# Patient Record
Sex: FEMALE | Race: OTHER | Employment: FULL TIME | URBAN - METROPOLITAN AREA
[De-identification: names, ages, dates, MRNs, and addresses within clinical notes are randomized per-mention and may not be internally consistent; named-entity substitution may affect disease eponyms.]

---

## 2024-03-20 ENCOUNTER — OFFICE VISIT (OUTPATIENT)
Dept: FAMILY MEDICINE CLINIC | Facility: CLINIC | Age: 44
End: 2024-03-20

## 2024-03-20 VITALS
WEIGHT: 175.2 LBS | HEART RATE: 66 BPM | BODY MASS INDEX: 29.19 KG/M2 | HEIGHT: 65 IN | OXYGEN SATURATION: 98 % | SYSTOLIC BLOOD PRESSURE: 116 MMHG | DIASTOLIC BLOOD PRESSURE: 68 MMHG | RESPIRATION RATE: 16 BRPM | TEMPERATURE: 97.4 F

## 2024-03-20 DIAGNOSIS — Z12.31 ENCOUNTER FOR SCREENING MAMMOGRAM FOR BREAST CANCER: ICD-10-CM

## 2024-03-20 DIAGNOSIS — E78.5 HYPERLIPIDEMIA, UNSPECIFIED HYPERLIPIDEMIA TYPE: ICD-10-CM

## 2024-03-20 DIAGNOSIS — Z00.00 HEALTHCARE MAINTENANCE: Primary | ICD-10-CM

## 2024-03-20 DIAGNOSIS — Z12.4 CERVICAL CANCER SCREENING: ICD-10-CM

## 2024-03-20 PROCEDURE — 99386 PREV VISIT NEW AGE 40-64: CPT | Performed by: NURSE PRACTITIONER

## 2024-03-20 NOTE — ASSESSMENT & PLAN NOTE
- Reviewed immunizations and screenings.   - Discussed regular dental and vision exams.   - Referred to GYN.   - Mammogram and routine labs ordered.

## 2024-03-20 NOTE — PROGRESS NOTES
Name: Nathalia Cohen      : 1980      MRN: [id number]  Encounter Provider: KELLY Bhandari  Encounter Date: 3/20/2024   Encounter department: [facility]    Assessment & Plan     1. Healthcare maintenance  Assessment & Plan:  - Reviewed immunizations and screenings.   - Discussed regular dental and vision exams.   - Referred to GYN.   - Mammogram and routine labs ordered.     Orders:  -     CBC and differential; Future  -     Comprehensive metabolic panel; Future  -     Lipid Panel with Direct LDL reflex; Future  -     TSH, 3rd generation with Free T4 reflex; Future    2. Hyperlipidemia, unspecified hyperlipidemia type  Assessment & Plan:  - Encourage healthy diet and regular exercise.  - Will obtain updated fasting lipid panel.     Orders:  -     Lipid Panel with Direct LDL reflex; Future    3. Encounter for screening mammogram for breast cancer  -     Mammo screening bilateral w 3d & cad; Future; Expected date: 2024    4. Cervical cancer screening  -     Ambulatory Referral to Obstetrics / Gynecology; Future           Subjective     Patient with self-reported history of HLD presents to office today to establish care. She is taking no medications on a daily basis. Just moved her from [place] about 6 months ago. Has no significant concerns or complaints today.             Review of Systems   Constitutional:  Negative for fatigue and fever.   HENT:  Negative for congestion, postnasal drip and trouble swallowing.    Eyes:  Negative for visual disturbance.   Respiratory:  Negative for cough and shortness of breath.    Cardiovascular:  Negative for chest pain and palpitations.   Gastrointestinal:  Negative for abdominal pain and blood in stool.   Endocrine: Negative for cold intolerance and heat intolerance.   Genitourinary:  Negative for difficulty urinating, dysuria and frequency.   Musculoskeletal:  Negative for gait problem.   Skin:  Negative for rash.  "  Neurological:  Negative for dizziness, syncope and headaches.   Hematological:  Negative for adenopathy.   Psychiatric/Behavioral:  Negative for behavioral problems.        Past Medical History:   Diagnosis Date   • High cholesterol      History reviewed. No pertinent surgical history.  Family History   Problem Relation Age of Onset   • Hypertension Father    • Diabetes Father    • Diabetes Paternal Grandmother      Social History     Socioeconomic History   • Marital status: Single     Spouse name: None   • Number of children: None   • Years of education: None   • Highest education level: None   Occupational History   • None   Tobacco Use   • Smoking status: Never   • Smokeless tobacco: Never   Vaping Use   • Vaping status: Never Used   Substance and Sexual Activity   • Alcohol use: Not Currently   • Drug use: Never   • Sexual activity: None   Other Topics Concern   • None   Social History Narrative   • None     Social Determinants of Health     Financial Resource Strain: Not on file   Food Insecurity: Not on file   Transportation Needs: Not on file   Physical Activity: Not on file   Stress: Not on file   Social Connections: Not on file   Intimate Partner Violence: Not on file   Housing Stability: Not on file     No current outpatient medications on file prior to visit.     No Known Allergies    There is no immunization history on file for this patient.    Objective     /68 (BP Location: Left arm, Patient Position: Sitting, Cuff Size: Large)   Pulse 66   Temp (!) 97.4 °F (36.3 °C) (Tympanic)   Resp 16   Ht 5' 5\" (1.651 m)   Wt 79.5 kg (175 lb 3.2 oz)   SpO2 98%   BMI 29.15 kg/m²     Physical Exam  Vitals and nursing note reviewed.   Constitutional:       General: She is not in acute distress.     Appearance: Normal appearance. She is not ill-appearing.   HENT:      Head: Normocephalic and atraumatic.      Right Ear: Tympanic membrane, ear canal and external ear normal.      Left Ear: Tympanic " membrane, ear canal and external ear normal.      Nose: Nose normal.      Mouth/Throat:      Mouth: Mucous membranes are moist.   Eyes:      Conjunctiva/sclera: Conjunctivae normal.      Pupils: Pupils are equal, round, and reactive to light.   Cardiovascular:      Rate and Rhythm: Normal rate and regular rhythm.      Heart sounds: Normal heart sounds.   Pulmonary:      Effort: Pulmonary effort is normal.      Breath sounds: Normal breath sounds.   Abdominal:      General: Bowel sounds are normal.      Palpations: Abdomen is soft.   Musculoskeletal:         General: Normal range of motion.      Cervical back: Normal range of motion.   Lymphadenopathy:      Cervical: No cervical adenopathy.   Skin:     General: Skin is warm and dry.   Neurological:      Mental Status: She is alert and oriented to person, place, and time.   Psychiatric:         Mood and Affect: Mood normal.         Behavior: Behavior normal.       KELLY Bhandari

## 2024-03-21 ENCOUNTER — APPOINTMENT (OUTPATIENT)
Dept: LAB | Age: 44
End: 2024-03-21
Payer: COMMERCIAL

## 2024-03-21 DIAGNOSIS — Z00.00 HEALTHCARE MAINTENANCE: ICD-10-CM

## 2024-03-21 DIAGNOSIS — E78.5 HYPERLIPIDEMIA, UNSPECIFIED HYPERLIPIDEMIA TYPE: ICD-10-CM

## 2024-03-21 LAB
ALBUMIN SERPL BCP-MCNC: 4.2 G/DL (ref 3.5–5)
ALP SERPL-CCNC: 41 U/L (ref 34–104)
ALT SERPL W P-5'-P-CCNC: 13 U/L (ref 7–52)
ANION GAP SERPL CALCULATED.3IONS-SCNC: 8 MMOL/L (ref 4–13)
AST SERPL W P-5'-P-CCNC: 15 U/L (ref 13–39)
BASOPHILS # BLD AUTO: 0.04 THOUSANDS/ÂΜL (ref 0–0.1)
BASOPHILS NFR BLD AUTO: 1 % (ref 0–1)
BILIRUB SERPL-MCNC: 1.03 MG/DL (ref 0.2–1)
BUN SERPL-MCNC: 7 MG/DL (ref 5–25)
CALCIUM SERPL-MCNC: 9 MG/DL (ref 8.4–10.2)
CHLORIDE SERPL-SCNC: 104 MMOL/L (ref 96–108)
CHOLEST SERPL-MCNC: 189 MG/DL
CO2 SERPL-SCNC: 26 MMOL/L (ref 21–32)
CREAT SERPL-MCNC: 0.58 MG/DL (ref 0.6–1.3)
EOSINOPHIL # BLD AUTO: 0.45 THOUSAND/ÂΜL (ref 0–0.61)
EOSINOPHIL NFR BLD AUTO: 8 % (ref 0–6)
ERYTHROCYTE [DISTWIDTH] IN BLOOD BY AUTOMATED COUNT: 13.3 % (ref 11.6–15.1)
GFR SERPL CREATININE-BSD FRML MDRD: 113 ML/MIN/1.73SQ M
GLUCOSE P FAST SERPL-MCNC: 93 MG/DL (ref 65–99)
HCT VFR BLD AUTO: 39.5 % (ref 34.8–46.1)
HDLC SERPL-MCNC: 71 MG/DL
HGB BLD-MCNC: 13 G/DL (ref 11.5–15.4)
IMM GRANULOCYTES # BLD AUTO: 0.01 THOUSAND/UL (ref 0–0.2)
IMM GRANULOCYTES NFR BLD AUTO: 0 % (ref 0–2)
LDLC SERPL CALC-MCNC: 109 MG/DL (ref 0–100)
LYMPHOCYTES # BLD AUTO: 2.29 THOUSANDS/ÂΜL (ref 0.6–4.47)
LYMPHOCYTES NFR BLD AUTO: 42 % (ref 14–44)
MCH RBC QN AUTO: 32.3 PG (ref 26.8–34.3)
MCHC RBC AUTO-ENTMCNC: 32.9 G/DL (ref 31.4–37.4)
MCV RBC AUTO: 98 FL (ref 82–98)
MONOCYTES # BLD AUTO: 0.49 THOUSAND/ÂΜL (ref 0.17–1.22)
MONOCYTES NFR BLD AUTO: 9 % (ref 4–12)
NEUTROPHILS # BLD AUTO: 2.22 THOUSANDS/ÂΜL (ref 1.85–7.62)
NEUTS SEG NFR BLD AUTO: 40 % (ref 43–75)
NRBC BLD AUTO-RTO: 0 /100 WBCS
PLATELET # BLD AUTO: 201 THOUSANDS/UL (ref 149–390)
PMV BLD AUTO: 11.2 FL (ref 8.9–12.7)
POTASSIUM SERPL-SCNC: 3.5 MMOL/L (ref 3.5–5.3)
PROT SERPL-MCNC: 6.6 G/DL (ref 6.4–8.4)
RBC # BLD AUTO: 4.02 MILLION/UL (ref 3.81–5.12)
SODIUM SERPL-SCNC: 138 MMOL/L (ref 135–147)
TRIGL SERPL-MCNC: 43 MG/DL
TSH SERPL DL<=0.05 MIU/L-ACNC: 1.25 UIU/ML (ref 0.45–4.5)
WBC # BLD AUTO: 5.5 THOUSAND/UL (ref 4.31–10.16)

## 2024-03-21 PROCEDURE — 84443 ASSAY THYROID STIM HORMONE: CPT

## 2024-03-21 PROCEDURE — 36415 COLL VENOUS BLD VENIPUNCTURE: CPT

## 2024-03-21 PROCEDURE — 80053 COMPREHEN METABOLIC PANEL: CPT

## 2024-03-21 PROCEDURE — 85025 COMPLETE CBC W/AUTO DIFF WBC: CPT

## 2024-03-21 PROCEDURE — 80061 LIPID PANEL: CPT

## 2024-04-05 ENCOUNTER — CONSULT (OUTPATIENT)
Dept: OBGYN CLINIC | Facility: CLINIC | Age: 44
End: 2024-04-05
Payer: COMMERCIAL

## 2024-04-05 VITALS
SYSTOLIC BLOOD PRESSURE: 110 MMHG | HEIGHT: 65 IN | WEIGHT: 176 LBS | BODY MASS INDEX: 29.32 KG/M2 | DIASTOLIC BLOOD PRESSURE: 64 MMHG

## 2024-04-05 DIAGNOSIS — Z01.419 WELL WOMAN EXAM WITH ROUTINE GYNECOLOGICAL EXAM: Primary | ICD-10-CM

## 2024-04-05 DIAGNOSIS — Z12.4 CERVICAL CANCER SCREENING: ICD-10-CM

## 2024-04-05 DIAGNOSIS — Z30.011 ENCOUNTER FOR INITIAL PRESCRIPTION OF CONTRACEPTIVE PILLS: ICD-10-CM

## 2024-04-05 PROCEDURE — G0145 SCR C/V CYTO,THINLAYER,RESCR: HCPCS | Performed by: OBSTETRICS & GYNECOLOGY

## 2024-04-05 PROCEDURE — 99386 PREV VISIT NEW AGE 40-64: CPT | Performed by: OBSTETRICS & GYNECOLOGY

## 2024-04-05 PROCEDURE — G0476 HPV COMBO ASSAY CA SCREEN: HCPCS | Performed by: OBSTETRICS & GYNECOLOGY

## 2024-04-05 RX ORDER — NORETHINDRONE ACETATE AND ETHINYL ESTRADIOL .02; 1 MG/1; MG/1
1 TABLET ORAL DAILY
Qty: 84 TABLET | Refills: 3 | Status: SHIPPED | OUTPATIENT
Start: 2024-04-05 | End: 2024-06-28

## 2024-04-05 NOTE — PROGRESS NOTES
"Jo Cohen is a 43 y.o. female who presents for annual well woman exam.     GYN:   No vaginal discharge, labial erythema or lesions, dyspareunia.   Menarche at 10.   LMP: 3/28/24   Menses are regular, q 28-30 days, lasting 2-3 days.   Contraception: none, has been on OCP's in the past   Patient is  sexually active with male partner.   Gynecologic surgeries: none    OB:    female   's    :   No dysuria, urinary frequency or urgency.   No hematuria, flank pain, incontinence.    Breast:   No breast mass, skin changes, dimpling, reddening, nipple retraction.   No breast discharge.   Patient does not have a family history of breast, endometrial, or ovarian ca.     General:   She does  feel safe in the relationship.  She does feel safe in her home.   Exercises  0  times per week.   ETOH use: occasional   Tobacco use: none   Recreational drug use: none    Screening:   Cervical cancer: last pap smear in . Results were normal per patient.   Breast cancer: no prior screening, currently scheduled   Colon cancer: N/A   STD screening: no prior history.    Review of Systems  Pertinent items are noted in HPI.      Objective      /64 (BP Location: Left arm, Patient Position: Sitting, Cuff Size: Standard)   Ht 5' 5\" (1.651 m)   Wt 79.8 kg (176 lb)   LMP 2024 (Exact Date)   BMI 29.29 kg/m²     Physical Exam  Constitutional:       Appearance: She is well-developed.   Cardiovascular:      Rate and Rhythm: Normal rate and regular rhythm.      Heart sounds: Normal heart sounds. No murmur heard.     No friction rub. No gallop.   Pulmonary:      Effort: Pulmonary effort is normal. No respiratory distress.      Breath sounds: No wheezing.   Abdominal:      Palpations: Abdomen is soft.      Tenderness: There is no abdominal tenderness.   Musculoskeletal:         General: No tenderness.   Neurological:      Mental Status: She is alert and oriented to person, place, and time.   Vitals " reviewed.            Assessment     Nathalia Cohen is a 43 y.o.  with normal gynecologic exam.     Plan     1.  Routine well woman exam done today.  2.  Pap and HPV:Pap with HPV was done today.  Current ASCCP Guidelines reviewed.  3.  Mammogram ordered. Recommend yearly mammography.   4.  The patient declined STD testing.  Safe sex practices have been discussed.  5. The patient is sexually active. She accepted contraception and options have been discussed.  OCP's were ordered.  6.  Exercise Recommendation: The importance of regular exercise/physical activity was discussed. Recommend exercise 3-5 times per week for at least 30 minutes.   7. Patient to return to office in 12 months for annual exam.

## 2024-04-09 LAB
HPV HR 12 DNA CVX QL NAA+PROBE: NEGATIVE
HPV16 DNA CVX QL NAA+PROBE: NEGATIVE
HPV18 DNA CVX QL NAA+PROBE: NEGATIVE

## 2024-04-13 LAB
LAB AP GYN PRIMARY INTERPRETATION: NORMAL
Lab: NORMAL

## 2024-05-01 PROBLEM — Z00.00 HEALTHCARE MAINTENANCE: Status: RESOLVED | Noted: 2024-03-20 | Resolved: 2024-05-01

## 2024-05-01 PROBLEM — Z12.4 CERVICAL CANCER SCREENING: Status: RESOLVED | Noted: 2024-03-20 | Resolved: 2024-05-01

## 2024-06-26 ENCOUNTER — HOSPITAL ENCOUNTER (OUTPATIENT)
Dept: MAMMOGRAPHY | Facility: CLINIC | Age: 44
Discharge: HOME | End: 2024-06-26
Payer: COMMERCIAL

## 2024-06-26 DIAGNOSIS — Z12.31 ENCOUNTER FOR SCREENING MAMMOGRAM FOR BREAST CANCER: ICD-10-CM

## 2024-06-26 PROCEDURE — 77063 BREAST TOMOSYNTHESIS BI: CPT

## 2024-06-26 PROCEDURE — 77067 SCR MAMMO BI INCL CAD: CPT

## 2025-02-17 ENCOUNTER — HOSPITAL ENCOUNTER (EMERGENCY)
Facility: HOSPITAL | Age: 45
Discharge: HOME | End: 2025-02-17
Attending: EMERGENCY MEDICINE
Payer: COMMERCIAL

## 2025-02-17 VITALS
SYSTOLIC BLOOD PRESSURE: 158 MMHG | DIASTOLIC BLOOD PRESSURE: 87 MMHG | OXYGEN SATURATION: 99 % | HEART RATE: 66 BPM | RESPIRATION RATE: 18 BRPM | TEMPERATURE: 98.8 F

## 2025-02-17 DIAGNOSIS — Z30.011 ENCOUNTER FOR INITIAL PRESCRIPTION OF CONTRACEPTIVE PILLS: ICD-10-CM

## 2025-02-17 DIAGNOSIS — R09.81 NASAL CONGESTION: ICD-10-CM

## 2025-02-17 DIAGNOSIS — J02.9 PHARYNGITIS: Primary | ICD-10-CM

## 2025-02-17 LAB
FLUAV AG UPPER RESP QL IA.RAPID: NEGATIVE
FLUBV AG UPPER RESP QL IA.RAPID: NEGATIVE
S PYO DNA THROAT QL NAA+PROBE: NOT DETECTED
SARS-COV+SARS-COV-2 AG RESP QL IA.RAPID: NEGATIVE

## 2025-02-17 PROCEDURE — 87811 SARS-COV-2 COVID19 W/OPTIC: CPT | Performed by: EMERGENCY MEDICINE

## 2025-02-17 PROCEDURE — 87651 STREP A DNA AMP PROBE: CPT | Performed by: EMERGENCY MEDICINE

## 2025-02-17 PROCEDURE — 99284 EMERGENCY DEPT VISIT MOD MDM: CPT | Performed by: EMERGENCY MEDICINE

## 2025-02-17 PROCEDURE — 87804 INFLUENZA ASSAY W/OPTIC: CPT | Performed by: EMERGENCY MEDICINE

## 2025-02-17 PROCEDURE — 99282 EMERGENCY DEPT VISIT SF MDM: CPT

## 2025-02-17 RX ORDER — ACETAMINOPHEN 325 MG/1
975 TABLET ORAL ONCE
Status: COMPLETED | OUTPATIENT
Start: 2025-02-17 | End: 2025-02-17

## 2025-02-17 RX ORDER — OXYMETAZOLINE HYDROCHLORIDE 0.05 G/100ML
2 SPRAY NASAL ONCE
Status: COMPLETED | OUTPATIENT
Start: 2025-02-17 | End: 2025-02-17

## 2025-02-17 RX ORDER — NAPROXEN 250 MG/1
250 TABLET ORAL ONCE
Status: COMPLETED | OUTPATIENT
Start: 2025-02-17 | End: 2025-02-17

## 2025-02-17 RX ADMIN — OXYMETAZOLINE HYDROCHLORIDE 2 SPRAY: 0.05 SPRAY NASAL at 13:55

## 2025-02-17 RX ADMIN — DEXAMETHASONE SODIUM PHOSPHATE 10 MG: 10 INJECTION, SOLUTION INTRAMUSCULAR; INTRAVENOUS at 13:55

## 2025-02-17 RX ADMIN — NAPROXEN 250 MG: 250 TABLET ORAL at 13:57

## 2025-02-17 RX ADMIN — ACETAMINOPHEN 975 MG: 325 TABLET, FILM COATED ORAL at 13:55

## 2025-02-17 NOTE — Clinical Note
Nathalia Cohen was seen and treated in our emergency department on 2/17/2025.                Diagnosis:     Nathalia  may return to work on return date.    She may return on this date: 02/20/2025         If you have any questions or concerns, please don't hesitate to call.      Lyle Arndt MD    ______________________________           _______________          _______________  Hospital Representative                              Date                                Time

## 2025-02-17 NOTE — ED PROVIDER NOTES
Time reflects when diagnosis was documented in both MDM as applicable and the Disposition within this note       Time User Action Codes Description Comment    2/17/2025  1:48 PM Lyle Arndt Add [J02.9] Pharyngitis     2/17/2025  1:48 PM Lyle Arndt Add [R09.81] Nasal congestion           ED Disposition       ED Disposition   Discharge    Condition   Stable    Date/Time   Mon Feb 17, 2025  1:48 PM    Comment   Nathalia Cohen discharge to home/self care.                   Assessment & Plan       Medical Decision Making  44-year-old female here with sore throat, nasal congestion, cough, general malaise.  Oropharyngeal exam seems most consistent with viral pharyngitis but will swab for strep at patient's request.  Also swab for flu/COVID at patient's request.  Treat with single dose Decadron, Afrin for nasal congestion not to be used for more than 3 to 5 days, recommend NSAIDs or Tylenol for analgesia.  Rest, fluids, follow-up with PCP.  Given note for work.  Discussed outpatient follow-up with return precautions.    Amount and/or Complexity of Data Reviewed  Labs: ordered.    Risk  OTC drugs.  Prescription drug management.             Medications   naproxen (NAPROSYN) tablet 250 mg (250 mg Oral Given 2/17/25 1357)   acetaminophen (TYLENOL) tablet 975 mg (975 mg Oral Given 2/17/25 1355)   dexamethasone oral liquid 10 mg 1 mL (10 mg Oral Given 2/17/25 1355)   oxymetazoline (AFRIN) 0.05 % nasal spray 2 spray (2 sprays Each Nare Given 2/17/25 1355)       ED Risk Strat Scores                                                History of Present Illness       Chief Complaint   Patient presents with    Sore Throat     Sore throat, headache, congestion ongoing since friday       Past Medical History:   Diagnosis Date    High cholesterol       History reviewed. No pertinent surgical history.   Family History   Problem Relation Age of Onset    Hypertension Father     Diabetes Father         My grand mother dies with  diabetes    Diabetes Paternal Grandmother     Breast cancer Neg Hx       Social History     Tobacco Use    Smoking status: Never    Smokeless tobacco: Never   Vaping Use    Vaping status: Never Used   Substance Use Topics    Alcohol use: Yes     Alcohol/week: 4.0 standard drinks of alcohol     Types: 2 Glasses of wine, 2 Cans of beer per week     Comment: OCC.    Drug use: Never      E-Cigarette/Vaping    E-Cigarette Use Never User       E-Cigarette/Vaping Substances    Nicotine No     THC No     CBD No     Flavoring No     Other No     Unknown No       I have reviewed and agree with the history as documented.     Nathalia is a pleasant 44-year-old female here for evaluation of 1 to 2-day history of sore throat, nasal congestion, cough, mild laryngitis.  She went to work this morning but had to leave because she was having trouble communicating due to laryngitis.  Complains of frontal sinus pressure, painful swallowing but is able to tolerate liquids.  Reports mild generalized fatigue/malaise.  No fevers.  No nausea or vomiting.  Has mild to moderate gradual onset headache without severe headache, neck pain/stiffness.  Denies known medical problems takes only OCPs.  No chest pain or shortness of breath.  No abdominal pain.  No rashes.      Sore Throat  Associated symptoms: cough and voice change    Associated symptoms: no abdominal pain, no chest pain, no chills, no ear pain, no fever, no rash, no shortness of breath and no trouble swallowing        Review of Systems   Constitutional:  Positive for fatigue. Negative for chills and fever.   HENT:  Positive for sinus pressure, sinus pain, sore throat and voice change. Negative for ear pain and trouble swallowing.    Eyes:  Negative for visual disturbance.   Respiratory:  Positive for cough. Negative for shortness of breath.    Cardiovascular:  Negative for chest pain and palpitations.   Gastrointestinal:  Negative for abdominal pain, nausea and vomiting.    Genitourinary:  Negative for dysuria and hematuria.   Musculoskeletal:  Negative for arthralgias and back pain.   Skin:  Negative for color change and rash.   Neurological:  Negative for syncope.   All other systems reviewed and are negative.          Objective       ED Triage Vitals   Temperature Pulse Blood Pressure Respirations SpO2 Patient Position - Orthostatic VS   02/17/25 1307 02/17/25 1307 02/17/25 1307 02/17/25 1307 02/17/25 1307 02/17/25 1307   98.8 °F (37.1 °C) 66 158/87 18 99 % Sitting      Temp Source Heart Rate Source BP Location FiO2 (%) Pain Score    02/17/25 1307 02/17/25 1307 02/17/25 1307 -- 02/17/25 1355    Oral Monitor Right arm  5      Vitals      Date and Time Temp Pulse SpO2 Resp BP Pain Score FACES Pain Rating User   02/17/25 1355 -- -- -- -- -- 5 -- [facility]   02/17/25 1307 98.8 °F (37.1 °C) 66 99 % 18 158/87 -- -- HW            Physical Exam  Vitals and nursing note reviewed.   Constitutional:       General: She is not in acute distress.     Appearance: She is well-developed.   HENT:      Head: Normocephalic and atraumatic.      Mouth/Throat:      Mouth: Mucous membranes are moist.      Pharynx: Posterior oropharyngeal erythema present. No oropharyngeal exudate.      Comments: Pharyngeal erythema with a few small scattered lesions most consistent with stomatitis.  True exudate seems less likely.  Uvula is midline, no evidence of PTA or RPA.  No trismus.  Sublingual area is soft and unremarkable.  Eyes:      Conjunctiva/sclera: Conjunctivae normal.   Cardiovascular:      Rate and Rhythm: Normal rate and regular rhythm.      Heart sounds: No murmur heard.  Pulmonary:      Effort: Pulmonary effort is normal. No respiratory distress.      Breath sounds: Normal breath sounds.   Abdominal:      Palpations: Abdomen is soft.      Tenderness: There is no abdominal tenderness.   Musculoskeletal:         General: No swelling.      Cervical back: Neck supple.   Skin:     General: Skin is warm and dry.       Capillary Refill: Capillary refill takes less than 2 seconds.   Neurological:      Mental Status: She is alert.   Psychiatric:         Mood and Affect: Mood normal.         Results Reviewed       Procedure Component Value Units Date/Time    Strep A PCR [[id number]]  (Normal) Collected: 02/17/25 1353    Lab Status: Final result Specimen: Throat Updated: 02/17/25 1442     STREP A PCR Not Detected    FLU/COVID Rapid Antigen (30 min. TAT) - Preferred screening test in ED [[id number]]  (Normal) Collected: 02/17/25 1353    Lab Status: Final result Specimen: Nares from Nose Updated: 02/17/25 1429     SARS COV Rapid Antigen Negative     Influenza A Rapid Antigen Negative     Influenza B Rapid Antigen Negative    Narrative:      This test has been performed using the Mobi Rideridel Tatiana 2 FLU+SARS Antigen test under the Emergency Use Authorization (EUA). This test has been validated by the  and verified by the performing laboratory. The Tatiana uses lateral flow immunofluorescent sandwich assay to detect SARS-COV, Influenza A and Influenza B Antigen.     The Quidel Tatiana 2 SARS Antigen test does not differentiate between SARS-CoV and SARS-CoV-2.     Negative results are presumptive and may be confirmed with a molecular assay, if necessary, for patient management. Negative results do not rule out SARS-CoV-2 or influenza infection and should not be used as the sole basis for treatment or patient management decisions. A negative test result may occur if the level of antigen in a sample is below the limit of detection of this test.     Positive results are indicative of the presence of viral antigens, but do not rule out bacterial infection or co-infection with other viruses.     All test results should be used as an adjunct to clinical observations and other information available to the provider.    FOR PEDIATRIC PATIENTS - copy/paste COVID Guidelines URL to browser:  [email or web address]            No orders to display       Procedures    ED Medication and Procedure Management   Prior to Admission Medications   Prescriptions Last Dose Informant Patient Reported? Taking?   norethindrone-ethinyl estradiol (Loestrin 1/20, 21,) 1-20 MG-MCG per tablet   No No   Sig: Take 1 tablet by mouth daily      Facility-Administered Medications: None     Discharge Medication List as of 2/17/2025  1:49 PM        CONTINUE these medications which have NOT CHANGED    Details   norethindrone-ethinyl estradiol (Loestrin 1/20, 21,) 1-20 MG-MCG per tablet Take 1 tablet by mouth daily, Starting Fri 4/5/2024, Until Fri 6/28/2024, Normal           No discharge procedures on file.  ED SEPSIS DOCUMENTATION   Time reflects when diagnosis was documented in both MDM as applicable and the Disposition within this note       Time User Action Codes Description Comment    2/17/2025  1:48 PM Lyle Arndt [J02.9] Pharyngitis     2/17/2025  1:48 PM Lyle Arndt [R09.81] Nasal congestion                  Lyle Arndt MD  02/17/25 4102

## 2025-02-18 ENCOUNTER — TELEPHONE (OUTPATIENT)
Age: 45
End: 2025-02-18

## 2025-02-18 RX ORDER — NORETHINDRONE ACETATE AND ETHINYL ESTRADIOL .02; 1 MG/1; MG/1
1 TABLET ORAL DAILY
Qty: 56 TABLET | Refills: 0 | Status: SHIPPED | OUTPATIENT
Start: 2025-02-18 | End: 2025-02-18 | Stop reason: SDUPTHER

## 2025-02-18 RX ORDER — NORETHINDRONE ACETATE AND ETHINYL ESTRADIOL .02; 1 MG/1; MG/1
1 TABLET ORAL DAILY
Qty: 28 TABLET | Refills: 12 | Status: SHIPPED | OUTPATIENT
Start: 2025-02-18 | End: 2025-02-21 | Stop reason: SDUPTHER

## 2025-02-18 NOTE — TELEPHONE ENCOUNTER
Alyson calling from [facility] pharmacy stating that the Loestrin was ordered for dispense quantity is 56 tablets and the pack contains 21 tablets, alyson is asking for further clarification

## 2025-02-19 NOTE — TELEPHONE ENCOUNTER
Called and left vm for the pharmacy stating Dr. Morrison message. Advised them to call back if they had any further questions.

## 2025-02-21 ENCOUNTER — OFFICE VISIT (OUTPATIENT)
Dept: FAMILY MEDICINE CLINIC | Facility: CLINIC | Age: 45
End: 2025-02-21
Payer: COMMERCIAL

## 2025-02-21 ENCOUNTER — TELEPHONE (OUTPATIENT)
Dept: OBGYN CLINIC | Facility: CLINIC | Age: 45
End: 2025-02-21

## 2025-02-21 VITALS
TEMPERATURE: 98 F | WEIGHT: 187 LBS | HEIGHT: 65 IN | HEART RATE: 73 BPM | BODY MASS INDEX: 31.16 KG/M2 | OXYGEN SATURATION: 97 % | DIASTOLIC BLOOD PRESSURE: 98 MMHG | RESPIRATION RATE: 16 BRPM | SYSTOLIC BLOOD PRESSURE: 120 MMHG

## 2025-02-21 DIAGNOSIS — E78.5 HYPERLIPIDEMIA, UNSPECIFIED HYPERLIPIDEMIA TYPE: Primary | ICD-10-CM

## 2025-02-21 DIAGNOSIS — Z23 ENCOUNTER FOR IMMUNIZATION: ICD-10-CM

## 2025-02-21 DIAGNOSIS — Z30.011 ENCOUNTER FOR INITIAL PRESCRIPTION OF CONTRACEPTIVE PILLS: Primary | ICD-10-CM

## 2025-02-21 PROCEDURE — 99213 OFFICE O/P EST LOW 20 MIN: CPT | Performed by: NURSE PRACTITIONER

## 2025-02-21 PROCEDURE — 90471 IMMUNIZATION ADMIN: CPT

## 2025-02-21 PROCEDURE — 90673 RIV3 VACCINE NO PRESERV IM: CPT

## 2025-02-21 RX ORDER — NORETHINDRONE ACETATE AND ETHINYL ESTRADIOL .02; 1 MG/1; MG/1
1 TABLET ORAL DAILY
Qty: 21 TABLET | Refills: 12 | Status: SHIPPED | OUTPATIENT
Start: 2025-02-21 | End: 2025-03-14

## 2025-02-21 NOTE — ASSESSMENT & PLAN NOTE
Component      Latest Ref Rng 3/21/2024   Cholesterol      See Comment mg/dL 189    Triglycerides      See Comment mg/dL 43    HDL      >=50 mg/dL 71    LDL Calculated      0 - 100 mg/dL 109 (H)       - Well controlled.  - Encourage healthy diet and regular exercise.  - Will continue to monitor fasting lipid panel.   Orders:  •  CBC and differential; Future  •  Comprehensive metabolic panel; Future  •  Lipid Panel with Direct LDL reflex; Future  •  TSH, 3rd generation with Free T4 reflex; Future

## 2025-02-21 NOTE — PROGRESS NOTES
Name: Nathalia Cohen      : 1980      MRN: [id number]  Encounter Provider: KELLY Bhandari  Encounter Date: 2025   Encounter department: [facility]    Assessment & Plan  Hyperlipidemia, unspecified hyperlipidemia type  Component      Latest Ref Rng 3/21/2024   Cholesterol      See Comment mg/dL 189    Triglycerides      See Comment mg/dL 43    HDL      >=50 mg/dL 71    LDL Calculated      0 - 100 mg/dL 109 (H)       - Well controlled.  - Encourage healthy diet and regular exercise.  - Will continue to monitor fasting lipid panel.   Orders:  •  CBC and differential; Future  •  Comprehensive metabolic panel; Future  •  Lipid Panel with Direct LDL reflex; Future  •  TSH, 3rd generation with Free T4 reflex; Future    Encounter for immunization    Orders:  •  influenza vaccine, recombinant, PF, 0.5 mL IM (Flublok)         History of Present Illness     Patient with PMH of HLD presents to office today for follow up. She is requesting updated blood work. She denies any concerns or complaints today.        Review of Systems   Constitutional:  Negative for fatigue and fever.   HENT:  Negative for trouble swallowing.    Eyes:  Negative for visual disturbance.   Respiratory:  Negative for cough and shortness of breath.    Cardiovascular:  Negative for chest pain and palpitations.   Gastrointestinal:  Negative for abdominal pain and blood in stool.   Endocrine: Negative for cold intolerance and heat intolerance.   Genitourinary:  Negative for difficulty urinating and dysuria.   Musculoskeletal:  Negative for gait problem.   Skin:  Negative for rash.   Neurological:  Negative for dizziness, syncope and headaches.   Hematological:  Negative for adenopathy.   Psychiatric/Behavioral:  Negative for behavioral problems.      Past Medical History:   Diagnosis Date   • High cholesterol      History reviewed. No pertinent surgical history.  Family History   Problem Relation Age of  "Onset   • Hypertension Father    • Diabetes Father         My grand mother dies with diabetes   • Diabetes Paternal Grandmother    • Breast cancer Neg Hx      Social History     Tobacco Use   • Smoking status: Never   • Smokeless tobacco: Never   Vaping Use   • Vaping status: Never Used   Substance and Sexual Activity   • Alcohol use: Yes     Alcohol/week: 4.0 standard drinks of alcohol     Types: 2 Glasses of wine, 2 Cans of beer per week     Comment: OCC.   • Drug use: Never   • Sexual activity: Yes     Partners: Male     Birth control/protection: None     Current Outpatient Medications on File Prior to Visit   Medication Sig   • norethindrone-ethinyl estradiol (Loestrin 1/20, 21,) 1-20 MG-MCG per tablet Take 1 tablet by mouth daily for 28 days     No Known Allergies  There is no immunization history for the selected administration types on file for this patient.  Objective   /98 (BP Location: Left arm, Patient Position: Sitting, Cuff Size: Standard)   Pulse 73   Temp 98 °F (36.7 °C) (Tympanic)   Resp 16   Ht 5' 5\" (1.651 m)   Wt 84.8 kg (187 lb)   SpO2 97%   BMI 31.12 kg/m²     Physical Exam  Vitals and nursing note reviewed.   Constitutional:       Appearance: Normal appearance. She is well-developed.   HENT:      Head: Normocephalic and atraumatic.      Right Ear: External ear normal.      Left Ear: External ear normal.   Eyes:      Conjunctiva/sclera: Conjunctivae normal.   Cardiovascular:      Rate and Rhythm: Normal rate and regular rhythm.      Heart sounds: Normal heart sounds.   Pulmonary:      Effort: Pulmonary effort is normal.      Breath sounds: Normal breath sounds.   Musculoskeletal:         General: Normal range of motion.      Cervical back: Normal range of motion.   Skin:     General: Skin is warm and dry.   Neurological:      Mental Status: She is alert and oriented to person, place, and time.   Psychiatric:         Mood and Affect: Mood normal.         Behavior: Behavior normal. "

## 2025-02-21 NOTE — TELEPHONE ENCOUNTER
Alyson from [facility] called on clarification on how to dispense norethindrone-ethinyl estradiol . Alyson can be reached at [phone].